# Patient Record
Sex: MALE | NOT HISPANIC OR LATINO
[De-identification: names, ages, dates, MRNs, and addresses within clinical notes are randomized per-mention and may not be internally consistent; named-entity substitution may affect disease eponyms.]

---
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
## 2024-06-04 NOTE — HISTORY OF PRESENT ILLNESS
[FreeTextEntry1] : DRISS is a 16 year-old male who reports to the office with symptoms of hand tremor in both hands worse at when holding his hands in front of him and when trying to perform a specific action, such as holding a pencil. He reports that he has had the tremor for several years, but is not sure when it started. He denies taking any medications and reports that he is even less stressed than usual. He reports that it is more prominent when he is focusing on it. He denies any specific time of day corresponding to the tremor. He notes no changes in strength in his hands or strange sensations in the hands. He denies any weakness or dizziness, visual changes in vision, or falls. He does not know what is causing the tremor and seems unconcerned. Patient's mother reports that she brought her son to the neurologist after a new PCP referred her son to "rule out anything neurological."\par \par He has no family history of tremor (specifically, neither of his parents have a tremor)\par Had routine blood work a few months ago which mom believes was unremarkable 
187.96